# Patient Record
Sex: FEMALE | Race: WHITE | Employment: STUDENT | URBAN - METROPOLITAN AREA
[De-identification: names, ages, dates, MRNs, and addresses within clinical notes are randomized per-mention and may not be internally consistent; named-entity substitution may affect disease eponyms.]

---
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
## 2025-02-09 ENCOUNTER — HOSPITAL ENCOUNTER (EMERGENCY)
Facility: HOSPITAL | Age: 20
End: 2025-02-10
Attending: EMERGENCY MEDICINE
Payer: COMMERCIAL

## 2025-02-09 DIAGNOSIS — F33.41 MAJOR DEPRESSIVE DISORDER, RECURRENT, IN PARTIAL REMISSION (HCC): Primary | ICD-10-CM

## 2025-02-09 PROCEDURE — 80307 DRUG TEST PRSMV CHEM ANLYZR: CPT | Performed by: EMERGENCY MEDICINE

## 2025-02-09 PROCEDURE — 81025 URINE PREGNANCY TEST: CPT | Performed by: EMERGENCY MEDICINE

## 2025-02-09 PROCEDURE — 99205 OFFICE O/P NEW HI 60 MIN: CPT | Performed by: GENERAL PRACTICE

## 2025-02-09 PROCEDURE — 99285 EMERGENCY DEPT VISIT HI MDM: CPT

## 2025-02-09 PROCEDURE — 82075 ASSAY OF BREATH ETHANOL: CPT | Performed by: EMERGENCY MEDICINE

## 2025-02-09 RX ORDER — LORAZEPAM 1 MG/1
1 TABLET ORAL ONCE
Status: COMPLETED | OUTPATIENT
Start: 2025-02-09 | End: 2025-02-09

## 2025-02-09 RX ADMIN — LORAZEPAM 1 MG: 1 TABLET ORAL at 20:06

## 2025-02-09 NOTE — LETTER
[facility] EMERGENCY DEPARTMENT  [address]  [facility] [address]  Dept: [phone]      EMTALA TRANSFER CONSENT    NAME Davian Varghese                        2005                              MRN [id number]    I have been informed of my rights regarding examination, treatment, and transfer   by Dr. Fernanda Guy DO    Benefits: Specialized equipment and/or services available at the receiving facility (Include comment)________________________    Risks: Potential for delay in receiving treatment      Consent for Transfer:  I acknowledge that my medical condition has been evaluated and explained to me by the emergency department physician or other qualified medical person and/or my attending physician, who has recommended that I be transferred to the service of  Accepting Physician: Dr. Lewis at Accepting Facility Name, City & State : [facility], [facility] [address]. The above potential benefits of such transfer, the potential risks associated with such transfer, and the probable risks of not being transferred have been explained to me, and I fully understand them.  The doctor has explained that, in my case, the benefits of transfer outweigh the risks.  I agree to be transferred.    I authorize the performance of emergency medical procedures and treatments upon me in both transit and upon arrival at the receiving facility.  Additionally, I authorize the release of any and all medical records to the receiving facility and request they be transported with me, if possible.  I understand that the safest mode of transportation during a medical emergency is an ambulance and that the Hospital advocates the use of this mode of transport. Risks of traveling to the receiving facility by car, including absence of medical control, life sustaining equipment, such as oxygen, and medical personnel has been explained to me and I fully understand  them.    (CHARLES CORRECT BOX BELOW)  [  ]  I consent to the stated transfer and to be transported by ambulance/helicopter.  [  ]  I consent to the stated transfer, but refuse transportation by ambulance and accept full responsibility for my transportation by car.  I understand the risks of non-ambulance transfers and I exonerate the Hospital and its staff from any deterioration in my condition that results from this refusal.    X___________________________________________    DATE  02/10/25  TIME________  Signature of patient or legally responsible individual signing on patient behalf           RELATIONSHIP TO PATIENT_________________________                  Provider Certification    NAME Davian Varghese                                [facility] 2005                              MRN [id number]    A medical screening exam was performed on the above named patient.  Based on the examination:    Condition Necessitating Transfer The encounter diagnosis was Major depressive disorder, recurrent, in partial remission (HCC).    Patient Condition: The patient has been stabilized such that within reasonable medical probability, no material deterioration of the patient condition or the condition of the unborn child(jono) is likely to result from the transfer    Reason for Transfer: Level of Care needed not available at this facility    Transfer Requirements: [facility] [address] [facility] [address]   Space available and qualified personnel available for treatment as acknowledged by Aylin Rodriguez, , [phone]  Agreed to accept transfer and to provide appropriate medical treatment as acknowledged by       Dr. Lewis  Appropriate medical records of the examination and treatment of the patient are provided at the time of transfer   STAFF INITIAL WHEN COMPLETED _______  Transfer will be performed by qualified personnel from             and appropriate transfer equipment as required, including the  use of necessary and appropriate life support measures.    Provider Certification: I have examined the patient and explained the following risks and benefits of being transferred/refusing transfer to the patient/family:         Based on these reasonable risks and benefits to the patient and/or the unborn child(jono), and based upon the information available at the time of the patient’s examination, I certify that the medical benefits reasonably to be expected from the provision of appropriate medical treatments at another medical facility outweigh the increasing risks, if any, to the individual’s medical condition, and in the case of labor to the unborn child, from effecting the transfer.    X____________________________________________ DATE 02/10/25        TIME_______      ORIGINAL - SEND TO MEDICAL RECORDS   COPY - SEND WITH PATIENT DURING TRANSFER

## 2025-02-10 VITALS
RESPIRATION RATE: 16 BRPM | TEMPERATURE: 98.5 F | WEIGHT: 119.71 LBS | HEIGHT: 61 IN | SYSTOLIC BLOOD PRESSURE: 103 MMHG | BODY MASS INDEX: 22.6 KG/M2 | DIASTOLIC BLOOD PRESSURE: 68 MMHG | HEART RATE: 86 BPM | OXYGEN SATURATION: 98 %

## 2025-02-10 PROCEDURE — 99285 EMERGENCY DEPT VISIT HI MDM: CPT | Performed by: EMERGENCY MEDICINE

## 2025-02-10 RX ORDER — HYDROXYZINE HYDROCHLORIDE 25 MG/1
50 TABLET, FILM COATED ORAL ONCE
Status: COMPLETED | OUTPATIENT
Start: 2025-02-10 | End: 2025-02-10

## 2025-02-10 RX ADMIN — HYDROXYZINE HYDROCHLORIDE 50 MG: 25 TABLET, FILM COATED ORAL at 11:31

## 2025-02-10 NOTE — ED NOTES
CW spoke with Julian to log the psych consult request.    She does not know who the attending is tonight, but noted CW's request for Dr Galeas.    THEO Monroe, CIS ll  02/09/25 21:57

## 2025-02-10 NOTE — ED NOTES
CW received return call from Fernanda of [facility]. As per Fernanda, [facility] is out of network for member. CW advised Fernanda, pt is requesting to attend [facility] only and did not want any other referrals sent. [facility] requested this writer obtain contact name, number and email address of individual responsible for single case agreements at [facility].    CW placed call to [facility], spoke w/admissions and obtained requested info above.    MIKAELA placed call to Fernanda of [facility] and provided the following:    [facility] Contact: Shailesh Baptistebernice, [phone], email: [email or web address]. Fernanda will return call shortly after speaking w/her supervisor.    Insurance Authorization for admission:   Phone call placed to [facility]  Phone number: [phone]     Spoke to [facility]     4 days approved.  Level of care: 201  Review on 2/13/2025   Authorization # [id number]        Eligibility Verification System checked - ([phone]).  Online system / automated system indicates: **    Insurance Authorization for Transportation:    Phone call placed to **.   Phone number **.   Spoke to **.   Authorization #: **       MAY, MIKAELA

## 2025-02-10 NOTE — ED PROVIDER NOTES
Time reflects when diagnosis was documented in both MDM as applicable and the Disposition within this note       Time User Action Codes Description Comment    2/9/2025  9:26 PM Alex Hinkle Add [F33.41] Major depressive disorder, recurrent, in partial remission (HCC)           ED Disposition       ED Disposition   Transfer to Behavioral Health Condition   --    Date/Time   Mon Feb 10, 2025  1:16 AM    Comment   Davian Varghese should be transferred out to (TBD) and has been medically cleared.                Assessment & Plan       Medical Decision Making  18 yo f presenting to the ed for psych eval.  Was recently inpt.  Will have crisis/psych see    Psych recommends inpt therapy.  Pt medically cleared for inpt psychiatric care     Amount and/or Complexity of Data Reviewed  Labs: ordered.    Risk  Prescription drug management.  Decision regarding hospitalization.             Medications   LORazepam (ATIVAN) tablet 1 mg (1 mg Oral Given 2/9/25 2006)       ED Risk Strat Scores                                              History of Present Illness       Chief Complaint   Patient presents with    Depression     Pt c/o worsening depression/anxiety over the past few weeks. Takes medications for it. Denies recent med changes. +SI without plan. Denies HI/AH/VH.       Past Medical History:   Diagnosis Date    ADHD (attention deficit hyperactivity disorder)     Anxiety     Depression       History reviewed. No pertinent surgical history.   Family History   Problem Relation Age of Onset    No Known Problems Mother     No Known Problems Father       Social History     Tobacco Use    Smoking status: Never    Smokeless tobacco: Never   Vaping Use    Vaping status: Some Days   Substance Use Topics    Alcohol use: Never    Drug use: Never      E-Cigarette/Vaping    E-Cigarette Use Current Some Day User       E-Cigarette/Vaping Substances    Nicotine No     THC No     CBD No     Flavoring No     Other No     Unknown No       I  have reviewed and agree with the history as documented.     18 yo female presenting to the ED for psych eval.  Recently seen for same and recently in inpt care.   Reports increased depression since discharge weeks ago.  No plan. No HI, or hallucinations.  No physical complaints.         Review of Systems   Constitutional:  Negative for appetite change, chills, fatigue and fever.   HENT:  Negative for sneezing and sore throat.    Eyes:  Negative for visual disturbance.   Respiratory:  Negative for cough, choking, chest tightness, shortness of breath and wheezing.    Cardiovascular:  Negative for chest pain and palpitations.   Gastrointestinal:  Negative for abdominal pain, constipation, diarrhea, nausea and vomiting.   Genitourinary:  Negative for difficulty urinating and dysuria.   Neurological:  Negative for dizziness, weakness, light-headedness, numbness and headaches.   Psychiatric/Behavioral:  Positive for dysphoric mood.    All other systems reviewed and are negative.          Objective       ED Triage Vitals [02/09/25 1751]   Temperature Pulse Blood Pressure Respirations SpO2 Patient Position - Orthostatic VS   97.6 °F (36.4 °C) 89 116/70 18 98 % Sitting      Temp Source Heart Rate Source BP Location FiO2 (%) Pain Score    Temporal Monitor Left arm -- --      Vitals      Date and Time Temp Pulse SpO2 Resp BP Pain Score FACES Pain Rating User   02/09/25 1751 97.6 °F (36.4 °C) 89 98 % 18 116/70 -- -- MS            Physical Exam  Vitals and nursing note reviewed.   Constitutional:       General: She is not in acute distress.     Appearance: She is well-developed. She is not diaphoretic.   HENT:      Head: Normocephalic and atraumatic.   Eyes:      Pupils: Pupils are equal, round, and reactive to light.   Neck:      Vascular: No JVD.      Trachea: No tracheal deviation.   Cardiovascular:      Rate and Rhythm: Normal rate and regular rhythm.      Heart sounds: Normal heart sounds. No murmur heard.     No friction  rub. No gallop.   Pulmonary:      Effort: Pulmonary effort is normal. No respiratory distress.      Breath sounds: Normal breath sounds. No wheezing or rales.   Abdominal:      General: Bowel sounds are normal. There is no distension.      Palpations: Abdomen is soft.      Tenderness: There is no abdominal tenderness. There is no guarding or rebound.   Skin:     General: Skin is warm and dry.      Coloration: Skin is not pale.   Neurological:      Mental Status: She is alert and oriented to person, place, and time.      Cranial Nerves: No cranial nerve deficit.      Motor: No abnormal muscle tone.   Psychiatric:         Behavior: Behavior normal.         Results Reviewed       Procedure Component Value Units Date/Time    POCT pregnancy, urine [[id number]]  (Normal) Collected: 02/09/25 1901    Lab Status: Edited Result - FINAL Updated: 02/09/25 1952     EXT Preg Test, Ur Negative     Control Valid    Rapid drug screen, urine [[id number]]  (Normal) Collected: 02/09/25 1908    Lab Status: Final result Specimen: Urine, Clean Catch Updated: 02/09/25 1951     Amph/Meth UR Negative     Barbiturate Ur Negative     Benzodiazepine Urine Negative     Cocaine Urine Negative     Methadone Urine Negative     Opiate Urine Negative     PCP Ur Negative     THC Urine Negative     Oxycodone Urine Negative     Fentanyl Urine Negative     HYDROCODONE URINE Negative    Narrative:      FOR MEDICAL PURPOSES ONLY.   IF CONFIRMATION NEEDED PLEASE CONTACT THE LAB WITHIN 5 DAYS.    Drug Screen Cutoff Levels:  AMPHETAMINE/METHAMPHETAMINES  1000 ng/mL  BARBITURATES     200 ng/mL  BENZODIAZEPINES     200 ng/mL  COCAINE      300 ng/mL  METHADONE      300 ng/mL  OPIATES      300 ng/mL  PHENCYCLIDINE     25 ng/mL  THC       50 ng/mL  OXYCODONE      100 ng/mL  FENTANYL      5 ng/mL  HYDROCODONE     300 ng/mL    POCT alcohol breath test [[id number]]  (Normal) Resulted: 02/09/25 1903    Lab Status: Final result Updated: 02/09/25 1903     EXTBreath Alcohol  0.00            No orders to display       Procedures    ED Medication and Procedure Management   Prior to Admission Medications   Prescriptions Last Dose Informant Patient Reported? Taking?   ARIPiprazole (ABILIFY) 2 mg tablet   Yes No   Sig: Take 2 mg by mouth daily   Cholecalciferol (VITAMIN D3) 1,000 units tablet   No No   Sig: Take 1 tablet (1,000 Units total) by mouth daily Do not start before November 10, 2024.   albuterol (2.5 mg/3 mL) 0.083 % nebulizer solution   Yes No   Sig: Inhale 2.5 mg every 4 (four) hours as needed for wheezing   albuterol (PROVENTIL HFA,VENTOLIN HFA) 90 mcg/act inhaler   No No   Sig: Inhale 2 puffs every 6 (six) hours as needed for wheezing   cyanocobalamin (VITAMIN B-12) 500 MCG tablet   No No   Sig: Take 1 tablet (500 mcg total) by mouth daily   dicyclomine (BENTYL) 10 mg capsule   No No   Sig: Take 1 capsule (10 mg total) by mouth 3 (three) times a day as needed (Abdominal cramps)   ergocalciferol (VITAMIN D2) 50,000 units   Yes No   Sig: Take 50,000 Units by mouth once a week   hydrOXYzine HCL (ATARAX) 25 mg tablet   No No   Sig: Take 1 tablet (25 mg total) by mouth every 6 (six) hours   hydrOXYzine pamoate (VISTARIL) 25 mg capsule   Yes No   Sig: Take 25 mg by mouth 2 (two) times a day as needed for anxiety   traZODone (DESYREL) 50 mg tablet   Yes No   Sig: Take 50 mg by mouth daily at bedtime   venlafaxine (EFFEXOR-XR) 150 mg 24 hr capsule   Yes No   Sig: Take 150 mg by mouth daily      Facility-Administered Medications: None     Patient's Medications   Discharge Prescriptions    No medications on file     No discharge procedures on file.  ED SEPSIS DOCUMENTATION   Time reflects when diagnosis was documented in both MDM as applicable and the Disposition within this note       Time User Action Codes Description Comment    2/9/2025  9:26 PM Alex Hinkle Add [F33.41] Major depressive disorder, recurrent, in partial remission (HCC)                  Alex Hinkle MD  02/10/25  [id number]

## 2025-02-10 NOTE — ED NOTES
CW placed call to [facility], spoke w/Amanda. As per Amanda, a request to return call w/prior auth info has been placed within the queue.    TDS, CW

## 2025-02-10 NOTE — ED NOTES
Insurance Authorization for admission:   Phone call placed to [facility].  Phone number: [phone].     Spoke to Najma BUSTOS     5 days approved.  Level of care: 201.  Review on **.   Authorization # When bed is secured.        Eligibility Verification System checked - ([phone]).  Online system / automated system indicates: MA eligible.    Insurance Authorization for Transportation:        THEO Monroe, CIS ll  02/10/25 00:01

## 2025-02-10 NOTE — ED NOTES
Both pt and Dr Hinkle signed the 201 document.    Pt does not wish to go to [facility] stating there were too many young people that were very sociable and that intimidated her.    Pt is requesting to go anywhere else, but mother is attempting to drive the decision making and doesn't want pt to go to -. Pt is fine going to -[facility].Mother expressed concern that older men may bother pt. CW advised that on an adult in-pt unit there are pts between the ages of 18-49. And if anyone bothers pt, she should report it to a staff person immediately.    THEO Monroe, CIS ll  02/09/25 23:17

## 2025-02-10 NOTE — ED NOTES
Patient is accepted at [facility].  Patient is accepted by Dr. Lewis per Admissions.     Transportation is arranged with TBD.    Transportation is scheduled for TBD.   Patient may go to the floor at anytime.      TDS, CW

## 2025-02-10 NOTE — ED NOTES
CTS here for patient transport to [facility] at this time. Patient remains calm and cooperative, all belongings, paperwork and medications given to transport.      Tricia Nava RN  02/10/25 3063

## 2025-02-10 NOTE — ED NOTES
Pt would like hair products from her bag before leaving      Sandy Arciniega, AZUL  02/10/25 6348

## 2025-02-10 NOTE — ED NOTES
MIKAELA spoke with Tiffany max [facility] to log pre-cert request.    THEO Monroe, CIS ll  02/09/25 23:27

## 2025-02-10 NOTE — CONSULTS
TeleConsultation - Behavioral Health   Name: Davian Varghese 19 y.o. female I MRN: [id number]  Unit/Bed#: ED 06 I Date of Admission: 2/9/2025   Date of Service: 2/9/2025 I Hospital Day: 0  Inpatient consult to Psychiatry  Consult performed by: Melanie Galeas MD  Consult ordered by: Alex Hinkle MD        Physician Requesting Consult: Alex Hinkle MD  Principal Problem:<principal problem not specified>  Reason for Consult:  Psych Evaluation     Assessment & Plan   Unspecified Mood Disorder     Patient presents with current passive SI and worsening depressive symptoms as such recommend IP psychiatric admission for further evaluation and safety.     TREATMENT PLAN RECOMMENDATIONS:  Medications: None  PRN for sleep: None  PRN for agitation: None     Informed consent for the above medication has been obtained including discussion of the risks, benefits and alternatives: Yes    Disposition: The patient meets criteria for inpatient psychiatric hospitalization when medically stable due to an acute psychiatric illness.    Legal Status Recommendation: Patient is willing to remain in the hospital for voluntary treatment, should patient change their mind or attempt to leave please follow hospital policy to place on involuntary hold.    Multiple Antipsychotic Review: N/A    Psychotherapy/Psychoeducation: Provided to patient based on their needs and abilities in a manner that they could understand and accommodated their learning style.    Other/Medical Work Up and/or treatment modality recommendations: N/A to this case.    Patient Caregiver/Family Education: Provided education regarding relevant aspects of the treatment plan to identified patient support based on their needs and abilities in a manner that they could understand with the patient's express consent.    Follow-up: Re-consult PRN    Report regarding the above Assessment and Treatment plan was provided to: Dr. Hinkle     History of Present Illness      Per Crisis  Evaluation by Dominique Juan:   Pt presents to the ED with her mother due to increasing depression for the past 2 weeks. Pt notes she has been experiencing passive suicidal ideations with NO plan, with some anxiety. Pt denies any self injurious behaviors. Pt notes she has no friends and tends to isolate. Pt admits to 1 prior suicide attempt in September of 2024 via OD on Lexapro, for which she was hospitalized at the time. Pt denies any homicidal ideations, nor any auditory or visual hallucinations at this time. Pt does not identify any trigger for her depression, but does admit that she is not taking her meds consistently, as she is missing some days. Pt denies any D & A problems, nor any legal issues. Pt denies any Hx of abuse or trauma. Pt reports sleeping 10 hrs nightly due to the effects of Trazadone. Pt reports eating 1-2 meals daily, as since taking Concerta her appetite has decreased. Pt notes that she vapes Nicotine daily. Pt reports getting along well with all family members, and attending [employer] school. Pt was @ [facility] in December of 2024 for 2 days and was discharged. Pt notes she doesn't like group therapy and didn't participate. Pt reports seeing a psychiatrist virtually 2 months ago, and has no out-pt therapy. CW discussed Tx options with both pt and her mother, as well as the importance of out-pt therapy and psychiatry on a constant basis. Mother states she and her  work and cannot take pt to any appointments, therefore wants pt hospitalized. CW explaind that pt does not meet criteria for in-pt Tx at this time, but really does need to secure out-pt Tx. CW suggested a psych consult may help to assist in med changes. Pt and mother are in agreement. CW discussed this case with Dr Hinkle who is in agreement that pt does not requirein-pt Tx, and ordewred a psych consult.     Patient states that she has been feeling down lately and that it has been hard to talk to people and that this has been  going on for a while and that she has had inpatient before as recently as last month. Patient states that they increased her meds but left AMA. Patient states that several weeks later she started feeling bad. Patient reports that she is taking Effexor and that she is also taking Trazodone. Patients states that she is is in school virtually and lives with her family. Patient states that she had some passive SI this past week. Patient states that she has been sleeping about 9 hours per night. Patient states that she is having suicidal thoughts but no plan. Patient denied any current HI/AVH or other acute psychiatric complaints.       Psychiatric Review Of Systems:  sleep: yes  appetite changes: yes  weight changes: yes  energy/anergy: yes  interest/pleasure/anhedonia: no  somatic symptoms: yes  anxiety/panic: no  gato: no  guilty/hopeless: no  self injurious behavior/risky behavior: no    Historical Information   Past Psychiatric History:   Psychiatric Hospitalizations:   One past inpatient psychiatric admission at 4 different hospitals   Outpatient Treatment History:   past treatment with therapist (None)  Suicide Attempts:   Yes, one attempt by overdose  History of self-harm:   None  Violence History:   no  Past Psychiatric medication trials: Lexapro    Substance Abuse History: Patient Sondra       Family Psychiatric History:  Denied       Social History:  Education: high school diploma/GED  Learning Disabilities:  Denied   Marital history: single  Children: Denied   Living arrangement, social support: The patient lives in home with father and mother.  Occupational History: unknown occupation  Functioning Relationships: good support system.  Other Pertinent History: None    Traumatic History:   Abuse: None  Other Traumatic Events: none    I have reviewed the patient's PMH, PSH, Social History, Family History, Meds, and Allergies    Meds/Allergies      Allergies   Allergen Reactions    Latuda [Lurasidone] Seizures  "      Objective :  Temp:  [97.6 °F (36.4 °C)] 97.6 °F (36.4 °C)  HR:  [89] 89  BP: (116)/(70) 116/70  Resp:  [18] 18  SpO2:  [98 %] 98 %  O2 Device: None (Room air)    Mental Status Evaluation:  Appearance:  age appropriate   Behavior:  normal   Speech:  normal pitch and normal volume   Mood:  normal   Affect:  normal   Language: naming objects   Thought Process:  normal   Associations intact associations   Thought Content:  normal   Perceptual Disturbances: None   Risk Potential: Suicidal Ideations without plan  Homicidal Ideations none  Potential for Aggression No   Sensorium:  person, place, and time/date   Cognition:  recent and remote memory grossly intact   Consciousness:  alert    Attention: attention span and concentration were age appropriate   Intellect: within normal limits   Fund of Knowledge: awareness of current events: President    Insight:  limited   Judgment: limited   Muscle Strength:  Muscle Tone: normal  NFT  normal   Gait/Station: normal gait/station   Motor Activity: no abnormal movements     Lab Results: I have reviewed the following lab results:   No new results in last 24 hours.   Results from last 7 days   Lab Units 02/09/25  1908   BARBITURATE UR  Negative   BENZODIAZEPINE UR  Negative   THC UR  Negative   COCAINE UR  Negative   METHADONE URINE  Negative   OPIATE UR  Negative   PCP UR  Negative     Lipid Profile:   Lab Results   Component Value Date    CHOLESTEROL 283 (H) 09/25/2024     09/25/2024    TRIG 38 09/25/2024    LDLCALC 173 (H) 09/25/2024    NONHDLC 181 09/25/2024   Thyroid Studies:   Lab Results   Component Value Date    YGN6VMGCZPBE 1.932 09/25/2024     Ammonia: No results found for: \"AMMONIA\"  Drug Levels: No results found for: \"VALPROICTOT\", \"VALPROICACID\", \"LITHIUM\", \"CARBAMAZEPIN\", \"CLOZAPINE\", \"NCLOZIP\"    Imaging Results Review: No pertinent imaging studies reviewed.  Other Study Results Review: No additional pertinent studies reviewed.    Code Status: " Prior  Advance Directive and Living Will:      Power of :    POLST:      Screenings:   1. Nutrition Assessment (completed by Staff):      2. Pain Screening     3. Suicide Screening  ED Crisis Suicide Risk Assessment:      C-SSRS Screening (Nursing Assessment - recent):    C-SSRS Screening (Nursing Assessment - since last contact):      Suicide Risk Assessment completed by the Consultant: Based on today's assessment, Davian presents the following risk of harm to self: high.           C-SSRS    1) Have you wished you were dead or wished you could go to sleep and not wake up? Yes   2) Have you actually had any thoughts about killing yourself? Yes   If YES to 2, ask questions 3, 4, 5 and 6.   If NO to 2, skip to question 6.    3) Have you been thinking about how you might do this? Yes   4) Have you had these thoughts and had some intention of acting on them?  High Risk No   5) Have you started to work out or worked out the details of how to kill yourself? Did you intend to carry out this plan?  High Risk No   Always Ask Question 6  Life-time  Past 3 Months    6) Have you done anything, started to do anything, or prepared to do anything to end your life?   Examples: Took pills, tried to shoot yourself, cut yourself, tried to hang yourself, took out pills but didn't swallow any, held a gun but changed your mind or it was grabbed from your hand, went to the roof but didn't jump, collected pills, obtained a gun, gave away valuables, wrote a will or suicide note, etc.   If yes, was this within the past 3 months?  High Risk No      SAFE-T Suicide Risk Assessment    Risk Factors: Kurtis DACOSTA    Examples: prior psychiatric history, prior suicide attempts or self-directed violence, current psychiatric disorder, substance abuse, anhedonia, impulsivity, aggression, hopelessness, anxiety, insomnia, psychosis, family history of suicide attempts, child maltreatment, ongoing medical illness, intoxication, family distress,  history of trauma or abuse, social isolation, loss of primary relationships, culture, or sense of community      Access to lethal means: Denied          Protective Factors:  Access to and desire for care     Examples: family and community support, feelings of connectedness, support from ongoing medical care relationship, outpatient mental health support, skills in problem solving, skills in conflict resolution, cultural/[employer]ist beliefs that discourage suicide, responsibility to children or animals, fear of death or dying, identifies reasons for living, engaged in work or school         Suicide Inquiry:     Ideation: Yes     (frequency, intensity, duration - recent, worst ever)    Plan: Denied      (timing, location, lethality, availability, preparation)    Behavior: Yes     (past attempts, aborted attempts, rehearsals)    Intent: Denied     (extent to which patient believes plan is lethal, expects to carry out plan /wishes to die, regrets survival)    Or    See suicide screen         Suicide Risk Level: High     (High/Moderate/Low)         Interventions: Please follow hospital policies congruent with suicide risk level indicated including observation status, please see below for disposition, follow-up, and medication recommendations       Administrative Statements   VIRTUAL CARE DOCUMENTATION:     1. This service was provided via Telemedicine using Teams Virtual Rounding      2. Parties in the room with patient during teleconsult Patient only    3. Confidentiality My office door was closed     4. Participants No one else was in the room    5. Patient acknowledged consent and understanding of privacy and security of the  Telemedicine consult. I informed the patient that I have reviewed their record in Epic and presented the opportunity for them to ask any questions regarding the visit today.  The patient agreed to participate.    6. I have spent a total time of 30 minutes in caring for this patient on the day of  the visit/encounter including Obtaining or reviewing history  , not including the time spent for establishing the audio/video connection.

## 2025-02-10 NOTE — ED NOTES
The following is Dr Galeas's recommendation:    Patient presents with current passive SI and worsening depressive symptoms as such recommend IP psychiatric admission for further evaluation and safety.     The patient meets criteria for inpatient psychiatric hospitalization when medically stable due to an acute psychiatric illness.    Patient is willing to remain in the hospital for voluntary treatment, should patient change their mind or attempt to leave please follow hospital policy to place on involuntary hold.     THEO Monroe, CIS ll  02/09/25 22:21

## 2025-02-10 NOTE — ED NOTES
Pt presents to the ED with her mother due to increasing depression for the past 2 weeks. Pt notes she has been experiencing passive suicidal ideations with NO plan, with some anxiety. Pt denies any self injurious behaviors. Pt notes she has no friends and tends to isolate. Pt admits to 1 prior suicide attempt in September of 2024 via OD on Lexapro, for which she was hospitalized at the time. Pt denies any homicidal ideations, nor any auditory or visual hallucinations at this time. Pt does not identify any trigger for her depression, but does admit that she is not taking her meds consistently, as she is missing some days. Pt denies any D & A problems, nor any legal issues. Pt denies any Hx of abuse or trauma. Pt reports sleeping 10 hrs nightly due to the effects of Trazadone. Pt reports eating 1-2 meals daily, as since taking Concerta her appetite has decreased. Pt notes that she vapes Nicotine daily. Pt reports getting along well with all family members, and attending [employer] school. Pt was @ [facility] in December of 2024 for 2 days and was discharged. Pt notes she doesn't like group therapy and didn't participate. Pt reports seeing a psychiatrist virtually 2 months ago, and has no out-pt therapy. CW discussed Tx options with both pt and her mother, as well as the importance of out-pt therapy and psychiatry on a constant basis. Mother states she and her  work and cannot take pt to any appointments, therefore wants pt hospitalized. CW explaind that pt does not meet criteria for in-pt Tx at this time, but really does need to secure out-pt Tx. CW suggested a psych consult may help to assist in med changes. Pt and mother are in agreement. CW discussed this case with Dr Hinkle who is in agreement that pt does not requirein-pt Tx, and ordewred a psych consult.     THEO Monroe, CIS ll  02/09/25 21:56

## 2025-02-10 NOTE — ED NOTES
"CW spoke w/pt. Pt is not interested in going to - at this time. Pt states, \"I will go along w/what Mom wants me to do.\" Pt inquires if [facility] was contacted. CW clarified w/pt, pt is seeking placement at [facility] now. Pt confirms.     Pt inquired if pt may return home at this time. CW advised pt, pt completed telepsych consult and psychiatrist recommended IP tx.     CW advised INTAKE, pt is requesting to be referred to [facility] at this time.    CW placed call to [facility], spoke w/Ana Maria. As per Ana Maria, CW may send clinicals for review and pt will need to complete a phone assessment w/facility first.    MAY, MIKAELA  "

## 2025-02-19 ENCOUNTER — TELEPHONE (OUTPATIENT)
Age: 20
End: 2025-02-19

## 2025-02-19 NOTE — TELEPHONE ENCOUNTER
Patient has been added to the [facility] wait list with a referral.    Insurance: [place]  Insurance Type:    Commercial []   Medicaid [x]   [facility] (if applicable)   Medicare []  Location Preference:   Provider Preference:   Virtual: Yes [] No []  Were outside resources sent: Yes [x] No []

## 2025-05-07 NOTE — PROGRESS NOTES
Adult Annual Physical  Name: Davian Varghese      : 2005      MRN: [id number]  Encounter Provider: Jenny Herman PA-C  Encounter Date: 2025   Encounter department: [facility]    :  Assessment & Plan  Annual physical exam  - Discussed healthy diet, lifestyle, and screening recommendations with the patient. Appropriate orders placed.   - recent lab results reviewed and/or lab orders placed as appropriate for monitoring of the patient's chronic conditions or evaluation of acute complaints  - Discussed recommended vaccinations.  It appears that the patient has never had HPV or meningococcal vaccines.  Discussed this with the patient, but she would like to hold off on vaccination at this time.  She believes she went to a pediatrician out of network and is uncertain if she had those vaccines done.  I recommend she check her health records at home and bring us a copy of vaccination records if she had these immunizations done.       Mild persistent asthma without complication  Stable.  Primarily seasonal.  Continue albuterol as needed for acute shortness of breath or wheezing.       Birth control counseling  Patient was interested in discussing birth control only if it will help her lose weight. I discussed that I would not recommend birth control for weight loss and that some birth controls like oral contraceptives even contribute to weight gain.  Since she is not interested in the birth control for contraception at this time and her periods are regular, we will hold off on prescribing an OCP.  However, if she is interested in discussing other contraceptive methods, I can place a gynecology referral.  She would like the referral.  I discussed in detail with the patient that her BMI is currently normal and that she is not currently overweight or obese.  Given this, I would not recommend any weight loss medication at this time.  Recommend a healthy, well-balanced diet and regular  physical activity/exercise.  Orders:  •  Ambulatory Referral to Obstetrics / Gynecology; Future    CHENTE (generalized anxiety disorder)  Stable.  Continue follow-up with therapy and psychiatry.  Continue current medication regimen.       Need for hepatitis C screening test  Discussed recommended screening with the patient and appropriate order placed.    Orders:  •  Hepatitis C antibody; Future    Screening for HIV (human immunodeficiency virus)  Discussed recommended screening with the patient and appropriate order placed.    Orders:  •  HIV 1/2 AG/AB w Reflex [facility] for 2 yr old and above; Future    History of anemia  Patient states she has a history of anemia, but is uncertain the cause.  She would like to recheck blood counts.  Orders:  •  CBC and differential; Future    Elevated bilirubin  Very mild elevation in bilirubin in September.  Likely related to overdose of Lexapro.  Recommend rechecking metabolic panel for monitoring.  Orders:  •  Comprehensive metabolic panel; Future    Mixed hyperlipidemia  Last lipid panel reviewed and was elevated.  Recommend rescreening for monitoring.  Recommended diet low in cholesterol and fats.  Lab Results   Component Value Date    CHOLESTEROL 283 (H) 09/25/2024    CHOLESTEROL 230 (H) 08/25/2024     Lab Results   Component Value Date     09/25/2024    HDL 73 08/25/2024     Lab Results   Component Value Date    TRIG 38 09/25/2024    TRIG 70 08/25/2024     Lab Results   Component Value Date    LDLCALC 173 (H) 09/25/2024 09/25/2024     Orders:  •  Lipid Panel with Direct LDL reflex    Screening for STDs (sexually transmitted diseases)  Discussed recommended screening with the patient and appropriate order placed.    Orders:  •  Chlamydia/GC amplified DNA by PCR; Future    Poisoning by selective serotonin and norepinephrine reuptake inhibitors, intentional self-harm, initial encounter ([facility])  Per chart review, intentional overdose of Lexapro in September.  Patient denies  current or recent SI.  She feels her mood is currently stable on her current medication regimen she follows with psychiatry.  Continue current treatment plan and follow-up with psychiatry.  Follow-up if mood worsens   Or return to the ER with any thoughts of self-harm/SI.       Major depressive disorder, recurrent, in partial remission (HCC)  Depression Screening Follow-up Plan: Patient's depression screening was positive with a PHQ-9 score of 6. Patient with underlying depression and was advised to continue current medications as prescribed. Continue regular follow-up with their psychologist/therapist/psychiatrist who is managing their mental health condition(s).             Preventive Screenings:  - Diabetes Screening: screening up-to-date  - Cholesterol Screening: screening not indicated and has hyperlipidemia   - Chlamydia Screening: risks/benefits discussed and orders placed   - Hepatitis C screening: risks/benefits discussed, orders placed and patient agrees to screening   - HIV screening: orders placed, patient agrees to screening and risks/benefits discussed   - Cervical cancer screening: screening not indicated   - Lung cancer screening: screening not indicated     Immunizations:  - Immunizations due: Prevnar 20 and HPV (Gardasil 9)         History of Present Illness     Adult Annual Physical:  Patient presents for annual physical. Davian Varghese presents the office for annual, to establish care, and to discuss birth control and weight.  She is interested in possibly starting a birth control medication if it will help her lose weight.  She is not currently sexually active, but has been in the past.  She denies any history of STDs.  She is interested in using it because she feels that she has been gaining some weight, especially in her arms, recently.  She has a history of depression and follows with a therapist and psychiatrist regularly.     Diet and Physical Activity:  - Diet/Nutrition: no special  "diet.  - Exercise: walking.    Depression Screening:    - PHQ-9 Score: 6    General Health:  - Sleep: sleeps well.  - Hearing: normal hearing bilateral ears.  - Vision: wears glasses and most recent eye exam < 1 year ago.  - Dental: regular dental visits and brushes teeth twice daily.    /GYN Health:  - Follows with GYN: no.   - Menopause: premenopausal.     Review of Systems   Constitutional:  Negative for chills and fever.   HENT:  Negative for congestion and sore throat.    Eyes:  Negative for pain and visual disturbance.   Respiratory:  Negative for cough and shortness of breath.    Cardiovascular:  Negative for chest pain and palpitations.   Gastrointestinal:  Negative for abdominal pain, constipation and diarrhea.   Genitourinary:  Negative for dysuria and hematuria.   Musculoskeletal:  Negative for arthralgias and myalgias.   Skin:  Negative for color change and rash.   Neurological:  Negative for dizziness and headaches.         Objective   /68 (BP Location: Left arm, Patient Position: Sitting, Cuff Size: Standard)   Pulse 88   Temp 98.7 °F (37.1 °C) (Tympanic)   Resp 18   Ht 5' 1\" (1.549 m)   Wt 54.8 kg (120 lb 12.8 oz)   LMP 03/18/2025   SpO2 99%   BMI 22.82 kg/m²     Physical Exam  Vitals and nursing note reviewed.   Constitutional:       General: She is not in acute distress.     Appearance: She is well-developed.   HENT:      Head: Normocephalic and atraumatic.      Right Ear: Tympanic membrane normal.      Left Ear: Tympanic membrane normal.      Nose: Nose normal.      Mouth/Throat:      Mouth: Mucous membranes are moist.      Pharynx: Oropharynx is clear. No oropharyngeal exudate.   Eyes:      Extraocular Movements: Extraocular movements intact.      Conjunctiva/sclera: Conjunctivae normal.   Cardiovascular:      Rate and Rhythm: Normal rate and regular rhythm.      Heart sounds: Normal heart sounds. No murmur heard.     No friction rub. No gallop.   Pulmonary:      Effort: Pulmonary " effort is normal. No respiratory distress.      Breath sounds: Normal breath sounds. No wheezing, rhonchi or rales.   Abdominal:      Palpations: Abdomen is soft.      Tenderness: There is no abdominal tenderness.   Musculoskeletal:         General: No swelling.      Cervical back: Neck supple.   Skin:     General: Skin is warm and dry.      Capillary Refill: Capillary refill takes less than 2 seconds.   Neurological:      General: No focal deficit present.      Mental Status: She is alert.   Psychiatric:         Mood and Affect: Mood normal.

## 2025-05-07 NOTE — ASSESSMENT & PLAN NOTE
Stable.  Primarily seasonal.  Continue albuterol as needed for acute shortness of breath or wheezing.

## 2025-05-09 ENCOUNTER — OFFICE VISIT (OUTPATIENT)
Age: 20
End: 2025-05-09
Payer: COMMERCIAL

## 2025-05-09 VITALS
SYSTOLIC BLOOD PRESSURE: 114 MMHG | RESPIRATION RATE: 18 BRPM | BODY MASS INDEX: 22.81 KG/M2 | OXYGEN SATURATION: 99 % | DIASTOLIC BLOOD PRESSURE: 68 MMHG | TEMPERATURE: 98.7 F | HEART RATE: 88 BPM | HEIGHT: 61 IN | WEIGHT: 120.8 LBS

## 2025-05-09 DIAGNOSIS — Z11.3 SCREENING FOR STDS (SEXUALLY TRANSMITTED DISEASES): ICD-10-CM

## 2025-05-09 DIAGNOSIS — R17 ELEVATED BILIRUBIN: ICD-10-CM

## 2025-05-09 DIAGNOSIS — Z86.2 HISTORY OF ANEMIA: ICD-10-CM

## 2025-05-09 DIAGNOSIS — E78.2 MIXED HYPERLIPIDEMIA: ICD-10-CM

## 2025-05-09 DIAGNOSIS — Z30.09 BIRTH CONTROL COUNSELING: ICD-10-CM

## 2025-05-09 DIAGNOSIS — Z11.59 NEED FOR HEPATITIS C SCREENING TEST: ICD-10-CM

## 2025-05-09 DIAGNOSIS — F41.1 GAD (GENERALIZED ANXIETY DISORDER): ICD-10-CM

## 2025-05-09 DIAGNOSIS — F33.41 MAJOR DEPRESSIVE DISORDER, RECURRENT, IN PARTIAL REMISSION (HCC): ICD-10-CM

## 2025-05-09 DIAGNOSIS — Z11.4 SCREENING FOR HIV (HUMAN IMMUNODEFICIENCY VIRUS): ICD-10-CM

## 2025-05-09 DIAGNOSIS — T43.212A: ICD-10-CM

## 2025-05-09 DIAGNOSIS — J45.30 MILD PERSISTENT ASTHMA WITHOUT COMPLICATION: ICD-10-CM

## 2025-05-09 DIAGNOSIS — Z00.00 ANNUAL PHYSICAL EXAM: Primary | ICD-10-CM

## 2025-05-09 PROCEDURE — 99385 PREV VISIT NEW AGE 18-39: CPT

## 2025-05-09 RX ORDER — LISDEXAMFETAMINE DIMESYLATE 20 MG/1
20 CAPSULE ORAL
COMMUNITY
Start: 2025-04-14

## 2025-05-09 RX ORDER — BUSPIRONE HYDROCHLORIDE 10 MG/1
1 TABLET ORAL 2 TIMES DAILY
COMMUNITY
Start: 2025-03-11

## 2025-05-09 RX ORDER — CLONAZEPAM 0.25 MG/1
0.25 TABLET, ORALLY DISINTEGRATING ORAL DAILY PRN
COMMUNITY
Start: 2025-04-07

## 2025-05-09 NOTE — ASSESSMENT & PLAN NOTE
Depression Screening Follow-up Plan: Patient's depression screening was positive with a PHQ-9 score of 6. Patient with underlying depression and was advised to continue current medications as prescribed. Continue regular follow-up with their psychologist/therapist/psychiatrist who is managing their mental health condition(s).

## 2025-05-09 NOTE — ASSESSMENT & PLAN NOTE
Last lipid panel reviewed and was elevated.  Recommend rescreening for monitoring.  Recommended diet low in cholesterol and fats.  Lab Results   Component Value Date    CHOLESTEROL 283 (H) 09/25/2024    CHOLESTEROL 230 (H) 08/25/2024     Lab Results   Component Value Date     09/25/2024    HDL 73 08/25/2024     Lab Results   Component Value Date    TRIG 38 09/25/2024    TRIG 70 08/25/2024     Lab Results   Component Value Date    LDLCALC 173 (H) 09/25/2024 09/25/2024     Orders:  •  Lipid Panel with Direct LDL reflex

## 2025-05-09 NOTE — ASSESSMENT & PLAN NOTE
Patient states she has a history of anemia, but is uncertain the cause.  She would like to recheck blood counts.  Orders:  •  CBC and differential; Future

## 2025-05-15 ENCOUNTER — RESULTS FOLLOW-UP (OUTPATIENT)
Age: 20
End: 2025-05-15

## 2025-05-15 LAB
ALBUMIN SERPL-MCNC: 4.2 G/DL (ref 3.6–5.1)
ALBUMIN/GLOB SERPL: 1.6 (CALC) (ref 1–2.5)
ALP SERPL-CCNC: 86 U/L (ref 36–128)
ALT SERPL-CCNC: 13 U/L (ref 5–32)
AST SERPL-CCNC: 16 U/L (ref 12–32)
BASOPHILS # BLD AUTO: 17 CELLS/UL (ref 0–200)
BASOPHILS NFR BLD AUTO: 0.2 %
BILIRUB SERPL-MCNC: 0.3 MG/DL (ref 0.2–1.1)
BUN SERPL-MCNC: 11 MG/DL (ref 7–20)
BUN/CREAT SERPL: NORMAL (CALC) (ref 6–22)
CALCIUM SERPL-MCNC: 9.5 MG/DL (ref 8.9–10.4)
CHLORIDE SERPL-SCNC: 107 MMOL/L (ref 98–110)
CO2 SERPL-SCNC: 25 MMOL/L (ref 20–32)
CREAT SERPL-MCNC: 0.89 MG/DL (ref 0.5–0.96)
EOSINOPHIL # BLD AUTO: 199 CELLS/UL (ref 15–500)
EOSINOPHIL NFR BLD AUTO: 2.4 %
ERYTHROCYTE [DISTWIDTH] IN BLOOD BY AUTOMATED COUNT: 13.2 % (ref 11–15)
GFR/BSA.PRED SERPLBLD CYS-BASED-ARV: 96 ML/MIN/1.73M2
GLOBULIN SER CALC-MCNC: 2.6 G/DL (CALC) (ref 2–3.8)
GLUCOSE SERPL-MCNC: 97 MG/DL (ref 65–139)
HCT VFR BLD AUTO: 36.6 % (ref 35–45)
HCV AB SERPL QL IA: NORMAL
HGB BLD-MCNC: 11.3 G/DL (ref 11.7–15.5)
HIV 1+2 AB+HIV1 P24 AG SERPL QL IA: NORMAL
LYMPHOCYTES # BLD AUTO: 2490 CELLS/UL (ref 850–3900)
LYMPHOCYTES NFR BLD AUTO: 30 %
MCH RBC QN AUTO: 24.3 PG (ref 27–33)
MCHC RBC AUTO-ENTMCNC: 30.9 G/DL (ref 32–36)
MCV RBC AUTO: 78.7 FL (ref 80–100)
MONOCYTES # BLD AUTO: 432 CELLS/UL (ref 200–950)
MONOCYTES NFR BLD AUTO: 5.2 %
NEUTROPHILS # BLD AUTO: 5163 CELLS/UL (ref 1500–7800)
NEUTROPHILS NFR BLD AUTO: 62.2 %
PLATELET # BLD AUTO: 348 THOUSAND/UL (ref 140–400)
PMV BLD REES-ECKER: 10.7 FL (ref 7.5–12.5)
POTASSIUM SERPL-SCNC: 4.4 MMOL/L (ref 3.8–5.1)
PROT SERPL-MCNC: 6.8 G/DL (ref 6.3–8.2)
RBC # BLD AUTO: 4.65 MILLION/UL (ref 3.8–5.1)
SODIUM SERPL-SCNC: 137 MMOL/L (ref 135–146)
WBC # BLD AUTO: 8.3 THOUSAND/UL (ref 3.8–10.8)

## 2025-05-15 NOTE — TELEPHONE ENCOUNTER
----- Message from Jenny Herman PA-C sent at 5/15/2025  8:33 AM EDT -----  Blood counts shows very mild anemia.  Hemoglobin is mildly below normal, but hematocrit is within normal limits.  We will continue to monitor.  Otherwise lab work normal  ----- Message -----  From: [facility] Lab Results In  Sent: 5/15/2025   2:45 AM EDT  To: Jenny Herman PA-C

## 2025-08-14 ENCOUNTER — HOSPITAL ENCOUNTER (EMERGENCY)
Facility: HOSPITAL | Age: 20
Discharge: HOME | End: 2025-08-14
Attending: EMERGENCY MEDICINE | Admitting: EMERGENCY MEDICINE
Payer: COMMERCIAL

## 2025-08-14 VITALS
RESPIRATION RATE: 18 BRPM | HEIGHT: 61 IN | WEIGHT: 120 LBS | TEMPERATURE: 98.3 F | DIASTOLIC BLOOD PRESSURE: 63 MMHG | HEART RATE: 100 BPM | BODY MASS INDEX: 22.66 KG/M2 | OXYGEN SATURATION: 100 % | SYSTOLIC BLOOD PRESSURE: 110 MMHG

## 2025-08-14 DIAGNOSIS — F32.A DEPRESSION: Primary | ICD-10-CM

## 2025-08-14 PROCEDURE — 99284 EMERGENCY DEPT VISIT MOD MDM: CPT

## 2025-08-14 PROCEDURE — 99284 EMERGENCY DEPT VISIT MOD MDM: CPT | Performed by: EMERGENCY MEDICINE

## 2025-08-14 RX ORDER — CLONAZEPAM 0.5 MG/1
0.5 TABLET ORAL 2 TIMES DAILY
Qty: 14 TABLET | Refills: 0 | Status: SHIPPED | OUTPATIENT
Start: 2025-08-14 | End: 2025-08-21

## 2025-08-15 ENCOUNTER — HOSPITAL ENCOUNTER (EMERGENCY)
Facility: HOSPITAL | Age: 20
Discharge: HOME | End: 2025-08-16
Attending: EMERGENCY MEDICINE | Admitting: EMERGENCY MEDICINE
Payer: COMMERCIAL

## 2025-08-15 VITALS
OXYGEN SATURATION: 98 % | SYSTOLIC BLOOD PRESSURE: 109 MMHG | HEIGHT: 61 IN | HEART RATE: 70 BPM | DIASTOLIC BLOOD PRESSURE: 68 MMHG | TEMPERATURE: 98 F | WEIGHT: 130.29 LBS | RESPIRATION RATE: 20 BRPM | BODY MASS INDEX: 24.6 KG/M2

## 2025-08-15 DIAGNOSIS — F32.A DEPRESSION: Primary | ICD-10-CM

## 2025-08-15 LAB
ETHANOL EXG-MCNC: 0 MG/DL
EXT PREGNANCY TEST URINE: NEGATIVE
EXT. CONTROL: NORMAL

## 2025-08-15 PROCEDURE — 81025 URINE PREGNANCY TEST: CPT | Performed by: EMERGENCY MEDICINE

## 2025-08-15 PROCEDURE — 82075 ASSAY OF BREATH ETHANOL: CPT | Performed by: EMERGENCY MEDICINE

## 2025-08-15 PROCEDURE — 80307 DRUG TEST PRSMV CHEM ANLYZR: CPT | Performed by: EMERGENCY MEDICINE

## 2025-08-15 PROCEDURE — 99285 EMERGENCY DEPT VISIT HI MDM: CPT | Performed by: EMERGENCY MEDICINE

## 2025-08-15 PROCEDURE — 99284 EMERGENCY DEPT VISIT MOD MDM: CPT

## 2025-08-15 RX ORDER — CLONAZEPAM 0.5 MG/1
0.5 TABLET ORAL ONCE
Status: COMPLETED | OUTPATIENT
Start: 2025-08-15 | End: 2025-08-15

## 2025-08-15 RX ORDER — TRAZODONE HYDROCHLORIDE 50 MG/1
50 TABLET ORAL ONCE
Status: COMPLETED | OUTPATIENT
Start: 2025-08-15 | End: 2025-08-15

## 2025-08-15 RX ORDER — ARIPIPRAZOLE 10 MG/1
10 TABLET ORAL EVERY MORNING
COMMUNITY
Start: 2025-07-22

## 2025-08-15 RX ORDER — CEPHALEXIN 500 MG/1
500 CAPSULE ORAL EVERY 6 HOURS SCHEDULED
COMMUNITY
End: 2025-08-27

## 2025-08-15 RX ORDER — GABAPENTIN 300 MG/1
300 CAPSULE ORAL 3 TIMES DAILY
COMMUNITY
Start: 2025-07-22

## 2025-08-15 RX ORDER — HYDROXYZINE HYDROCHLORIDE 25 MG/1
25 TABLET, FILM COATED ORAL EVERY 6 HOURS PRN
COMMUNITY

## 2025-08-15 RX ADMIN — TRAZODONE HYDROCHLORIDE 50 MG: 50 TABLET ORAL at 23:59

## 2025-08-15 RX ADMIN — CLONAZEPAM 0.5 MG: 0.5 TABLET ORAL at 23:59

## 2025-08-16 ENCOUNTER — HOSPITAL ENCOUNTER (EMERGENCY)
Facility: HOSPITAL | Age: 20
End: 2025-08-16
Attending: EMERGENCY MEDICINE
Payer: COMMERCIAL

## 2025-08-16 VITALS
RESPIRATION RATE: 18 BRPM | SYSTOLIC BLOOD PRESSURE: 99 MMHG | TEMPERATURE: 97.8 F | DIASTOLIC BLOOD PRESSURE: 58 MMHG | OXYGEN SATURATION: 100 % | HEART RATE: 58 BPM

## 2025-08-16 DIAGNOSIS — F32.A DEPRESSION: Primary | ICD-10-CM

## 2025-08-16 LAB
AMPHETAMINES SERPL QL SCN: POSITIVE
AMPHETAMINES SERPL QL SCN: POSITIVE
BARBITURATES UR QL: NEGATIVE
BARBITURATES UR QL: NEGATIVE
BENZODIAZ UR QL: NEGATIVE
BENZODIAZ UR QL: NEGATIVE
COCAINE UR QL: NEGATIVE
COCAINE UR QL: NEGATIVE
ETHANOL EXG-MCNC: 0 MG/DL
EXT PREGNANCY TEST URINE: NEGATIVE
EXT. CONTROL: NORMAL
FENTANYL UR QL SCN: NEGATIVE
FENTANYL UR QL SCN: NEGATIVE
HYDROCODONE UR QL SCN: NEGATIVE
HYDROCODONE UR QL SCN: NEGATIVE
METHADONE UR QL: NEGATIVE
METHADONE UR QL: NEGATIVE
OPIATES UR QL SCN: NEGATIVE
OPIATES UR QL SCN: NEGATIVE
OXYCODONE+OXYMORPHONE UR QL SCN: NEGATIVE
OXYCODONE+OXYMORPHONE UR QL SCN: NEGATIVE
PCP UR QL: NEGATIVE
PCP UR QL: NEGATIVE
THC UR QL: NEGATIVE
THC UR QL: NEGATIVE

## 2025-08-16 PROCEDURE — 81025 URINE PREGNANCY TEST: CPT | Performed by: EMERGENCY MEDICINE

## 2025-08-16 PROCEDURE — 99285 EMERGENCY DEPT VISIT HI MDM: CPT

## 2025-08-16 PROCEDURE — 82075 ASSAY OF BREATH ETHANOL: CPT | Performed by: EMERGENCY MEDICINE

## 2025-08-16 PROCEDURE — 80307 DRUG TEST PRSMV CHEM ANLYZR: CPT | Performed by: EMERGENCY MEDICINE

## 2025-08-16 PROCEDURE — 99285 EMERGENCY DEPT VISIT HI MDM: CPT | Performed by: EMERGENCY MEDICINE
